# Patient Record
Sex: MALE | Race: ASIAN | NOT HISPANIC OR LATINO | Employment: UNEMPLOYED | URBAN - METROPOLITAN AREA
[De-identification: names, ages, dates, MRNs, and addresses within clinical notes are randomized per-mention and may not be internally consistent; named-entity substitution may affect disease eponyms.]

---

## 2023-09-21 ENCOUNTER — OFFICE VISIT (OUTPATIENT)
Dept: FAMILY MEDICINE | Facility: CLINIC | Age: 28
End: 2023-09-21
Payer: MEDICAID

## 2023-09-21 VITALS
OXYGEN SATURATION: 100 % | SYSTOLIC BLOOD PRESSURE: 111 MMHG | BODY MASS INDEX: 37.15 KG/M2 | HEIGHT: 69 IN | TEMPERATURE: 97.7 F | DIASTOLIC BLOOD PRESSURE: 72 MMHG | WEIGHT: 250.8 LBS | RESPIRATION RATE: 14 BRPM | HEART RATE: 57 BPM

## 2023-09-21 DIAGNOSIS — Z11.59 NEED FOR HEPATITIS C SCREENING TEST: ICD-10-CM

## 2023-09-21 DIAGNOSIS — Z11.4 SCREENING FOR HIV (HUMAN IMMUNODEFICIENCY VIRUS): ICD-10-CM

## 2023-09-21 DIAGNOSIS — M25.561 CHRONIC PAIN OF RIGHT KNEE: ICD-10-CM

## 2023-09-21 DIAGNOSIS — Z13.9 ENCOUNTER FOR SCREENING INVOLVING SOCIAL DETERMINANTS OF HEALTH (SDOH): Primary | ICD-10-CM

## 2023-09-21 DIAGNOSIS — G89.29 CHRONIC BILATERAL LOW BACK PAIN WITHOUT SCIATICA: ICD-10-CM

## 2023-09-21 DIAGNOSIS — M54.50 CHRONIC BILATERAL LOW BACK PAIN WITHOUT SCIATICA: ICD-10-CM

## 2023-09-21 DIAGNOSIS — G89.29 CHRONIC PAIN OF RIGHT KNEE: ICD-10-CM

## 2023-09-21 DIAGNOSIS — Z23 ENCOUNTER FOR IMMUNIZATION: ICD-10-CM

## 2023-09-21 PROCEDURE — 90471 IMMUNIZATION ADMIN: CPT

## 2023-09-21 PROCEDURE — 99203 OFFICE O/P NEW LOW 30 MIN: CPT | Mod: 25

## 2023-09-21 PROCEDURE — 90686 IIV4 VACC NO PRSV 0.5 ML IM: CPT

## 2023-09-21 ASSESSMENT — ENCOUNTER SYMPTOMS
JOINT SWELLING: 0
ARTHRALGIAS: 0
NAUSEA: 0
HEMATOCHEZIA: 0
DIZZINESS: 0
MYALGIAS: 1
WEAKNESS: 0
CONSTIPATION: 0
PARESTHESIAS: 0
SHORTNESS OF BREATH: 0
FEVER: 0
NERVOUS/ANXIOUS: 0
HEADACHES: 0
FREQUENCY: 0
CHILLS: 0
DIARRHEA: 0
ABDOMINAL PAIN: 0
DYSURIA: 0
PALPITATIONS: 0
HEMATURIA: 0
EYE PAIN: 0
HEARTBURN: 0
COUGH: 0
SORE THROAT: 0

## 2023-09-21 ASSESSMENT — PAIN SCALES - GENERAL: PAINLEVEL: MODERATE PAIN (4)

## 2023-09-21 NOTE — PROGRESS NOTES
"  Assessment & Plan     Chronic pain of right knee  - Orthopedic  Referral; Future  - MR Knee Right w/o Contrast; Future  Pain may stem from either chronic tendinitis or an MCL/PCL injury.  Given that patient has been trying PT without relief, it is reasonable to order an MRI.  I do recommend he follow-up with  orthopedics regarding interpretation and management.    Chronic bilateral low back pain without sciatica  - Physical Therapy Referral; Future  Patient's pain is currently nonradiating.  He he was advised that this pain is likely related to muscle weakness from previous providers in [place].  Given that patient has never tried physical therapy, I recommend that he try some PT for his back pain.  He might have a slight nerve impingement given that the pain previously radiated to his feet, although the radiation part has improved.  Pain may stem from a stenosis origin given that it seems worse when he is sitting or laying down for long periods.  He had also reported \"leg being asleep \"when I was assessing his knee, which may further point towards nerve impingement.      Encounter for immunization  - INFLUENZA VACCINE IM > 6 MONTHS VALENT IIV4 (AFLURIA/FLUZONE)        Artie Dee NP  [facility]    Jeff Moran is a 28 year old, presenting for the following health issues:  Knee Pain and MRI Referral    1 year ago, patient had fallen down and had a ligament injury. Was advised to get an MRI to determine whether he needs an operation by a doctor in [place]. He was seen in [place] by a doctor for this issue. Pain is improved compared to one year ago. Pain is worse with standing or sitting. Posterior and medial knee pain.    He had been wrestling with a partner and leg bent to the side and friend fell on the leg as well.   He has been doing PT to try and improve it. Does not use any analgesic medicine (used some about 1 year ago).    Whenever patient stands for about 1 hour, " patient occurs in the back, and whenever he sits, the pain increases with sitting. Right sided and medial lower back. This has been occurring for the past 3 years. Went to [place] for his back. Sometimes it radiates down the legs, but lately just occurs in his lower back. No issues with numbness/tingling (but does report it falls asleep at nighttime). It started when he was doing bodybuilding in the gym.      9/21/2023     9:54 AM   Additional Questions   Roomed by Luis Thibodeaux   Accompanied by Aster - Wife       Healthy Habits:     Getting at least 3 servings of Calcium per day:  Yes    Bi-annual eye exam:  Yes    Dental care twice a year:  NO    Sleep apnea or symptoms of sleep apnea:  None    Diet:  Regular (no restrictions)    Frequency of exercise:  4-5 days/week    Duration of exercise:  30-45 minutes    Taking medications regularly:  Yes    Medication side effects:  None    Additional concerns today:  No     Pt stated that he did not want a physical done today - would just like to address knee pain and get an MRI referral.       Review of Systems   Constitutional:  Negative for chills and fever.   HENT:  Negative for congestion, ear pain, hearing loss and sore throat.    Eyes:  Negative for pain and visual disturbance.   Respiratory:  Negative for cough and shortness of breath.    Cardiovascular:  Negative for chest pain, palpitations and peripheral edema.   Gastrointestinal:  Negative for abdominal pain, constipation, diarrhea, heartburn, hematochezia and nausea.   Genitourinary:  Negative for dysuria, frequency, genital sores, hematuria, impotence, penile discharge and urgency.   Musculoskeletal:  Positive for myalgias. Negative for arthralgias and joint swelling.   Skin:  Negative for rash.   Neurological:  Negative for dizziness, weakness, headaches and paresthesias.   Psychiatric/Behavioral:  Negative for mood changes. The patient is not nervous/anxious.       Constitutional, HEENT, cardiovascular,  "pulmonary, gi and gu systems are negative, except as otherwise noted.      Objective    /72 (BP Location: Right arm, Patient Position: Sitting, Cuff Size: Adult Large)   Pulse 57   Temp 97.7  F (36.5  C) (Temporal)   Resp 14   Ht 1.763 m (5' 9.41\")   Wt 113.8 kg (250 lb 12.8 oz)   SpO2 100%   BMI 36.60 kg/m    Body mass index is 36.6 kg/m .  Physical Exam   GENERAL: healthy, alert and no distress  MS: Right leg: No edema noted over right leg.  Patient does have tenderness to palpation over the medial aspect of the right knee and posterior side of the right knee.  No crepitus noted.  Some pain with the posterior drawer test.  No significant ligament laxity.  Waqar negative.  Valgus and varus negative, although patient does report sensation of \"leg being asleep\" with the leg fully straight.  SKIN: no suspicious lesions or rashes  PSYCH: mentation appears normal, affect normal/bright                "

## 2023-09-21 NOTE — COMMUNITY RESOURCES LIST (PATIENT PREFERRED LANGUAGE)
09/21/2023   [facility]  N/A  ????????????????? ????????? ????????????????????????????????????????????????????????????????? ??????????? ????????????????????????????????? ????????? ????????????????????????? ??????????  Phone: [phone]   Email: N/A   Address: [address] [place] [address]   Hours: N/A        ??????????????????       ?????????????? ???????????????????? ??????  1  [facility] - [place] Office - ???????????????? ??????????????????? (SHAP) ????????: 1.03 ?????      ?????/???   [address] [place] [address]  ????????: ????, ???????, ???, ???????????  ????: ?????????? - ????????? 08:30 - 17:00  ???????: ?????   Phone: [phone] Email: palma@[facility].org Website: http://www.[facility].org/[facility]-impact-areas/     2  ????????????????? - [facility] ?????????????????????????????? ????????: 1.3 ?????      ?????/???   [address] [place] [address]  ????????: ???????????  ????: ?????????? - ????????? 09:00 - 16:00  ???????: ?????   Phone: [phone] Email: gregg@[facility].Prevacusy.org Website: http://FancloudAleda E. Lutz Veterans Affairs Medical CenterCreditCards.comScotland [facility] Hospital.org/community/zy-wjhz-fkftv-jaqueline/     ??????????????????????????  3  [employer] [facility] ???????????? ????????: 0.84 ?????      ?????/???   [address] [place] [address]  ????????: ???, ???????????  ????: ?????????? - ??????????? 08:00 - 16:00 , ????????? 08:00 - 12:00  ???????: ?????   Phone: [phone] Email: ecc@[employer] The [employer].org Website: http://energyChina Broad Media.org/     4  [employer]) - [place] Office - ???????????????? ??????????????????? (SHAP) ????????: 1.03 ?????      ?????/???   [address] [place] [address]  ????????: ????, ???????, ???, ???????????  ????: ?????????? - ????????? 08:30 - 17:00  ???????: ?????   Phone: [phone] Email: palma@[facility] Website: http://www.[facility].org/[facility] Medical Center-impact-areas/          ???????????? ?????       ?????????? ???????  5  ???????????? [facility] -  ?????????? - ?????????? ????????: 0.93 ?????      ??????????????????   [address] [place] [address]  ????????: ???, ???????, ???, ???????????, ???????? ???????????? ????????  ????: ?????????? - ????????? 12:00 - 13:00  ???????: ?????   Phone: [phone] Email: myles@[email or web address].Forgame Website: https://www.[facility].org/locations/st_oscar_[facility]_ymca?utm_source=google&utm_medium=local&utm_campaign=local%20search     6  ????????????????? - [facility] ?????????????????????????????? ????????: 1.3 ?????      ??????????????????   [address] [place] [address]  ????????: ???????????  ????: ?????????? - ????????? 09:00 - 15:00  ???????: ??????????????????????, ?????   Phone: [phone] Email: gregg@[facility].Calligoationarmy.org Website: http://[facility].org/community/gc-fxoj-ijgvb-ave/     SNAP ?????????????????  7  ????????????????? - [facility] ?????????????????????????????? ????????: 1.3 ?????      ?????/???   [address] [place] [address]  ????????: ???????????  ????: ?????????? - ??????????? 09:00 - 16:00 , ????????? 09:00 - 14:00 , ???????????? 10:00 - 12:00  ???????: ?????   Phone: [phone] Email: gregg@[facility].org Website: http://[facility].org/community/cr-qtou-aliux-ave/     8  [place] ??????????????????? - MNFoodHelper (SNAP) ????????: 3.26 ?????      ?????/???   [address] [address] [place] [address]  ????????: ???, ???????, ???????????????, ???, ????, ???????????  ????: ?????????? - ????????? 09:00 - 17:00  ???????: ?????   Phone: [phone] Website: https://[place].gov/dhs/people-we-serve/adults/economic-assistance/food-nutrition/programs-and-services/supplemental-nutrition-assistance-program.jsp     ???????????????????? ????????? ????????????????  9  ????????????????? - [facility] ?????????????????????????????? ????????: 1.3 ?????      ??????????????????   [address] [place] [address]  ????????: ???????????  ????: ?????????? - ????????? 11:45 - 12:45   ???????: ?????   Phone: [phone] Email: freddysalashley@[facility].org Website: http://[facility].org/Critical access hospital/bj/     10  ??????????? ???????????? ??????? - ??????????????????????????? ??????????? ????????: 2.39 ?????      ????????????   [address] [place] [address]  ????????: ???????????  ????: ?????????? - ???????????? 06:30 - 07:30 , ?????????? - ???????????? 12:00 - 13:00 , ?????????? - ???????????? 17:30 - 18:30  ???????: ?????   Phone: [phone] Email: myles@[facility].org Website: https://[facility].org/about-us/contact/          ??????????????????       ????? ????????? ????????? ?????????????????  11  [employer] ????????: 4.27 ?????      ????????????   [address] [place] [address]  ????????: ???, ???????????  ????: ?????????? - ???????????? 24 ?????????????  ???????: ??????????????????????   Phone: [phone] Ext2 Website: http://www.Wimbarotransit.org     12  ????????? - [employer] Bus ????????: 4.9 ?????      ????????????   [address] [place] [address]  ????????: ???????????  ????: ????????? 09:00 - 14:00  ???????: ??????????????????????   Phone: [phone] Email: info@Cormedics.invino Website: http://www.dartsconnects.org/     ????????????????????????????????????????????????  13  [facility] ?????????????????????????????? - ???????????????? ?????????????????????????????? ????????: 1.83 ?????      ????????????   [address] [place] [address]  ????????: ???????????  ????: ?????????? - ????????? 08:00 - 16:00 Appt ??  ???????: ??????????????????????   Phone: [phone] Website: http://www.[facility].org/Medical-Services/Emergency-medical-services/Non-emergency-transportation/     14  ???????????????? - ????????????????????????????????? ??????????????? ????????: 2.12 ?????      ????????????   [address] [place] [address]  ????????: ???????????  ????: ?????????? - ??????????? 06:00 - 18:00 , ?????????  06:00 - 17:00  ???????: ??????????????????????, ?????   Phone: [phone] Email: office@[facility] Website: https://www.discoverride.com/          ??????????????????????? ????????????       ???????????????????????   911  [facility]   311  ??????????????????   [phone]  ???????????????????? Lifeline   [phone]) [phone] (TALK)  ?????????????????? Hotline   (373) 880-8603 (4-A-Child)  ??????????????????????????? hotline   (385) 380-5299 (HOPE)  ????????????????? Safeline   ([phone]) [phone] (RUNAWAY)  All-Options Talkline   ([phone]) [phone]  ????????????????????????????????   [phone] (HELP)

## 2023-09-21 NOTE — COMMUNITY RESOURCES LIST (ENGLISH)
09/21/2023   [facility]  N/A  For questions about this resource list or additional care needs, please contact your primary care clinic or care manager.  Phone: [phone]   Email: N/A   Address: [address] [place] [address]   Hours: N/A        Financial Stability       Rent and mortgage payment assistance  1  [facility] - [place] Office - Supportive Housing Assistance Program (SHAP) Distance: 1.03 miles      Phone/Virtual   [address] [id number]  Language: English, Hmong, Martina, Djiboutian  Hours: Mon - Fri 8:30 AM - 5:00 PM  Fees: Free   Phone: [phone]) [phone] Email: palma@[employer].SplashMaps Website: http://www.OVIA.org/[facility]-impact-areas/     2  [facility] Distance: 1.3 miles      Phone/Virtual   [address] [place] [address]  Language: English  Hours: Mon - Fri 9:00 AM - 4:00 PM  Fees: Free   Phone: [phone]) [phone] Email: gregg@[facility].org Website: http://[facility].org/[facility]/     Utility payment assistance  3  [employer] [facility] [facility] Distance: 0.84 miles      Phone/Virtual   [address] [place], [place] [address]  Language: English, Estonian  Hours: Mon - Thu 8:00 AM - 4:00 PM , Fri 8:00 AM - 12:00 PM  Fees: Free   Phone: (801) 533-6112 Email: ecc@energycents.org Website: http://energycents.org/     4  [facility] Office - Supportive Housing Assistance Program (SHAP) Distance: 1.03 miles      Phone/Virtual   [address] [id number]  Language: English, Hmong, Martina, Djiboutian  Hours: Mon - Fri 8:30 AM - 5:00 PM  Fees: Free   Phone: (353) 281-8622 Email: palma@[employer].SplashMaps Website: http://www.Talkoong.org/hap-impact-areas/          Food and Nutrition       Food pantry  5  [facility] Distance: 0.93 miles      [address] [place] [address]  Language: American Sign Language,  English, Hmong, Italian, Tamazight  Hours: Mon - Fri 12:00 PM - 1:00 PM  Fees: Free   Phone: (328) [phone] Email: info@[facility].SocialMart Website: https://www.[facility].SocialMart/locations/[facility]?utm_source=google&utm_medium=local&utm_campaign=local%20search     6  [facility] Distance: 1.3 miles      Pickup   [address] [place] [address]  Language: English  Hours: Mon - Tue 9:00 AM - 3:00 PM  Fees: Free, Self Pay   Phone: (328) [phone] Email: gregg@[facility] Website: http://[facility] CenteriCreate SoftwareK[facility].org/[facility]/qb-rppd-rdrvn-[facility]/     SNAP application assistance  7  [facility] Distance: 1.3 miles      Phone/Virtual   [address] [place] [address]  Language: English  Hours: Mon - Thu 9:00 AM - 4:00 PM , Fri 9:00 AM - 2:00 PM , Sun 10:00 AM - 12:00 PM  Fees: Free   Phone: [phone]) [phone] Email: gregg@[facility] Website: http://[facility] CenteriCreate SoftwareK[facility].org/[facility]/hs-myiw-pfjzl-[facility]/     8  [place] Department of Human Services - MNFoodHelper (SNAP) Distance: 2.12 miles      Phone/Virtual   [address] [address] [place] [address]  Language: English, Hmong, Costa Rican, Italian, Tamazight, Burmese  Hours: Mon - Fri 9:00 AM - 5:00 PM  Fees: Free   Phone: (227) 594-4233 Website: https://[place].gov/dhs/people-we-serve/adults/economic-assistance/food-nutrition/programs-and-services/supplemental-nutrition-assistance-program.jsp     Soup kitchen or free meals  9  [facility] Distance: 1.3 miles      Pickup   [address] [place] [address]  Language: English  Hours: Mon - Fri 11:45 AM - 12:45 PM  Fees: Free   Phone: (155) 876-5534 Email: gregg@[facility].1Mind.org Website: http://[facility].org/[facility]/     10  [facility] Distance: 1.83 miles      In-Person   [facility] [place] [address]   Language: English  Hours: Mon - Sun 6:30 AM - 7:30 AM , Mon - Sun 12:00 PM - 1:00 PM , Mon - Sun 5:30 PM - 6:30 PM  Fees: Free   Phone: (421) 269-4239 Email: info@[facility].me Website: https://[facility].Fry Multimedia/about-us/contact/          Transportation       Free or low-cost transportation  11  [employer] Distance: 2.39 miles      In-Person   [address] [place] [address]  Language: English, Samoan  Hours: Mon - Sun Open 24 Hours  Fees: Self Pay   Phone: (209) 769-1686 Ext.2 Website: http://www.Paystikt.Fry Multimedia     12  [employer]  [facility] Cir[facility] Bus Distance: 4.9 miles      In-Person   [address] [place] [address]  Language: English  Hours: Tue 9:00 AM - 2:00 PM  Fees: Self Pay   Phone: (945) 704-5756 Email: info@[employer] Me Website: http://www.Push Technology.Fry Multimedia/     Transportation to medical appointments  13  [facility] - Non-Emergency Medical Transportation Distance: 3.26 miles      In-Person   [address] [place] [address]  Language: English  Hours: Mon - Fri 8:00 AM - 4:00 PM Appt. Only  Fees: Self Pay   Phone: (686) 251-2726 Website: http://www.[facility].org/Medical-Services/Emergency-medical-services/Non-emergency-transportation/     14  [employer] Ride Distance: 4.27 miles      In-Person   [address] [address]  Language: English  Hours: Mon - Thu 6:00 AM - 6:00 PM , Fri 6:00 AM - 5:00 PM  Fees: Insurance, Self Pay   Phone: (217) 714-7710 Email: office@POKKT Website: https://www.POKKT/          Important Numbers & Websites       Emergency Services   911  City Services   311  Poison Control   (318) 585-3140  Suicide Prevention Lifeline   (502) 942-4810 (TALK)  Child Abuse Hotline   (305) 618-4982 (4-A-Child)  Sexual Assault Hotline   (532) 761-3449 (HOPE)  National Runaway Safeline   (369) 817-1303 (RUNAWAY)  All-Options Talkline   (625) 666-7951  Substance Abuse Referral   (637) 572-6522 (HELP)

## 2023-10-07 ENCOUNTER — ANCILLARY PROCEDURE (OUTPATIENT)
Dept: MRI IMAGING | Facility: CLINIC | Age: 28
End: 2023-10-07
Payer: COMMERCIAL

## 2023-10-07 DIAGNOSIS — G89.29 CHRONIC PAIN OF RIGHT KNEE: ICD-10-CM

## 2023-10-07 DIAGNOSIS — M25.561 CHRONIC PAIN OF RIGHT KNEE: ICD-10-CM

## 2023-10-07 PROCEDURE — 73721 MRI JNT OF LWR EXTRE W/O DYE: CPT | Mod: RT | Performed by: RADIOLOGY

## 2023-10-09 NOTE — PROGRESS NOTES
ASSESSMENT & PLAN    Luisa was seen today for pain.    Diagnoses and all orders for this visit:    Patellofemoral pain syndrome of right knee  -     Physical Therapy Referral; Future    Chronic pain of right knee  -     Orthopedic  Referral    Old tear of lateral meniscus of right knee, unspecified tear type  -     Physical Therapy Referral; Future      28-year-old male presents with right knee pain that has been ongoing for approximately 1 year.  He did have a remote wrestling injury, but the mechanism was unclear.  He has not tried anything for this so far. His exam was relatively unremarkable today, with the only positive finding of a positive patellar grind, but he had no ligamentous laxity, full range of motion, no effusion. History was significantly limited today due to language barrier and poor health literacy despite the use of an , but my suspicion is that his pain is primarily coming from patellofemoral pain syndrome.  It was very difficult to elicit whether he was having any mechanical symptoms upon taking his history, but it does not sound like this has been an issue.  He does have a very small lateral meniscus tear seen on MRI, but given lack of any mechanical symptoms I think this should be treated conservatively at this time.  The patient does report that he was told by another physician that he had an unknown ligament injury, however I do not see evidence of that today on either MRI or physical exam.    Plan:  - I encouraged the patient to work on staying active and I recommend that he start physical therapy and work on compliance with his home exercise program  - He can take Tylenol as needed for pain up to 3 times daily  - Follow-up in approximately 3 months after course of PT      Return in about 3 months (around 1/10/2024).      Dr. Su Castro, DO  [facility]  Sports Medicine     -----  Chief Complaint   Patient presents with    Right Knee -  Pain       SUBJECTIVE  Luisa Love is a/an 28 year old  male who is seen in consultation at the request of  Artie Dee N.P. for evaluation of R knee pain.  Onset was 1 year ago; acute injury from wrestling match after being landed on by another wrestler, pain is located anterior, medial, and diffusely around knee. Symptoms are worsened by standing for a long time and sitting on his knees. He has tried no treatment. Associated symptoms include posterior numbness, does not feel likes it gives out on him.     The patient is seen with his wife today . History obtained with help of .     Prior injury/Surgical history of affected joint: No  Social History/Occupation: Currently not working    REVIEW OF SYSTEMS:  Pertinent positives/negative: As stated above in HPI    OBJECTIVE:  There were no vitals taken for this visit.   General: Alert and in no distress  Skin: no visable rashes  CV: Extremities appear well perfused   Resp: normal respiratory effort, no conversational dyspnea   Psych: normal mood, affect  MSK:  RIGHT KNEE  Inspection:    Normal alignment; no edema, erythema, or ecchymosis present  Palpation:    Bony and ligamentous landmarks are nontender.    No effusion is present    Patellofemoral crepitus is Present  Range of Motion:     00 extension to 1350 flexion  Strength:  5/5 knee extension    Extensor mechanism intact  Special Tests:    Positive: Patellar grind    Negative: patellar apprehension, MCL/valgus stress (0 & 30 deg), LCL/varus stress (0 & 30 deg), Lachman's, posterior drawer       RADIOLOGY:  Final results and radiologist's interpretation available in the [facility] health record.  Images below were personally reviewed and discussed with the patient in the office today.  My personal interpretation of the performed imaging: MRI of the right knee from 10/7/2023 does reveal slight blunting of the lateral meniscus on sagittal view at the posterior horn which may be consistent with a small  free edge tear, and no evidence of any ligament disruption, medial meniscus is intact.      Review of prior external note(s) from - primary care  50 minutes spent by me on the date of the encounter doing chart review, history and exam, documentation and further activities per the note

## 2023-10-10 ENCOUNTER — OFFICE VISIT (OUTPATIENT)
Dept: ORTHOPEDICS | Facility: CLINIC | Age: 28
End: 2023-10-10
Payer: COMMERCIAL

## 2023-10-10 DIAGNOSIS — G89.29 CHRONIC PAIN OF RIGHT KNEE: ICD-10-CM

## 2023-10-10 DIAGNOSIS — M25.561 CHRONIC PAIN OF RIGHT KNEE: ICD-10-CM

## 2023-10-10 DIAGNOSIS — M23.200 OLD TEAR OF LATERAL MENISCUS OF RIGHT KNEE, UNSPECIFIED TEAR TYPE: ICD-10-CM

## 2023-10-10 DIAGNOSIS — M22.2X1 PATELLOFEMORAL PAIN SYNDROME OF RIGHT KNEE: Primary | ICD-10-CM

## 2023-10-10 PROCEDURE — 99204 OFFICE O/P NEW MOD 45 MIN: CPT | Performed by: STUDENT IN AN ORGANIZED HEALTH CARE EDUCATION/TRAINING PROGRAM

## 2023-10-10 NOTE — LETTER
10/10/2023         RE: Luisa Love  [address]  [place] [address]        Dear Colleague,    Thank you for referring your patient, Luisa Love, to the [facility]. Please see a copy of my visit note below.    ASSESSMENT & PLAN    Luisa was seen today for pain.    Diagnoses and all orders for this visit:    Patellofemoral pain syndrome of right knee  -     Physical Therapy Referral; Future    Chronic pain of right knee  -     Orthopedic  Referral    Old tear of lateral meniscus of right knee, unspecified tear type  -     Physical Therapy Referral; Future      28-year-old male presents with right knee pain that has been ongoing for approximately 1 year.  He did have a remote wrestling injury, but the mechanism was unclear.  He has not tried anything for this so far. His exam was relatively unremarkable today, with the only positive finding of a positive patellar grind, but he had no ligamentous laxity, full range of motion, no effusion. History was significantly limited today due to language barrier and poor health literacy despite the use of an , but my suspicion is that his pain is primarily coming from patellofemoral pain syndrome.  It was very difficult to elicit whether he was having any mechanical symptoms upon taking his history, but it does not sound like this has been an issue.  He does have a very small lateral meniscus tear seen on MRI, but given lack of any mechanical symptoms I think this should be treated conservatively at this time.  The patient does report that he was told by another physician that he had an unknown ligament injury, however I do not see evidence of that today on either MRI or physical exam.    Plan:  - I encouraged the patient to work on staying active and I recommend that he start physical therapy and work on compliance with his home exercise program  - He can take Tylenol as needed for pain up to 3  times daily  - Follow-up in approximately 3 months after course of PT      Return in about 3 months (around 1/10/2024).      Dr. Su Castro, DO  [facility]  Sports Medicine     -----  Chief Complaint   Patient presents with     Right Knee - Pain       SUBJECTIVE  Luisa Love is a/an 28 year old  male who is seen in consultation at the request of  Artie Dee N.P. for evaluation of R knee pain.  Onset was 1 year ago; acute injury from wrestling match after being landed on by another wrestler, pain is located anterior, medial, and diffusely around knee. Symptoms are worsened by standing for a long time and sitting on his knees. He has tried no treatment. Associated symptoms include posterior numbness, does not feel likes it gives out on him.     The patient is seen with his wife today . History obtained with help of .     Prior injury/Surgical history of affected joint: No  Social History/Occupation: Currently not working    REVIEW OF SYSTEMS:  Pertinent positives/negative: As stated above in HPI    OBJECTIVE:  There were no vitals taken for this visit.   General: Alert and in no distress  Skin: no visable rashes  CV: Extremities appear well perfused   Resp: normal respiratory effort, no conversational dyspnea   Psych: normal mood, affect  MSK:  RIGHT KNEE  Inspection:    Normal alignment; no edema, erythema, or ecchymosis present  Palpation:    Bony and ligamentous landmarks are nontender.    No effusion is present    Patellofemoral crepitus is Present  Range of Motion:     00 extension to 1350 flexion  Strength:  5/5 knee extension    Extensor mechanism intact  Special Tests:    Positive: Patellar grind    Negative: patellar apprehension, MCL/valgus stress (0 & 30 deg), LCL/varus stress (0 & 30 deg), Lachman's, posterior drawer       RADIOLOGY:  Final results and radiologist's interpretation available in the [facility] health record.  Images below were personally  reviewed and discussed with the patient in the office today.  My personal interpretation of the performed imaging: MRI of the right knee from 10/7/2023 does reveal slight blunting of the lateral meniscus on sagittal view at the posterior horn which may be consistent with a small free edge tear, and no evidence of any ligament disruption, medial meniscus is intact.      Review of prior external note(s) from - primary care  50 minutes spent by me on the date of the encounter doing chart review, history and exam, documentation and further activities per the note               Again, thank you for allowing me to participate in the care of your patient.        Sincerely,        Su Castro, DO

## 2023-10-10 NOTE — PATIENT INSTRUCTIONS
-Can take Tylenol up to three times daily as needed for pain  -Start physical therapy and home exercise program       Thank you for [facility]!    DR. REYNA'S CLINIC LOCATIONS:     [place]  TRIAGE LINE: [phone] [address] APPOINTMENTS: [phone]   [place] [address] RADIOLOGY: [phone]   (Mondays & Tuesdays) HAND THERAPY: [phone]    PHYSICAL THERAPY: [phone]   [place] BILLING QUESTIONS: [phone] [address] FAX: [phone]   [place] [address]    (Thursdays & Fridays)

## 2023-10-13 ENCOUNTER — THERAPY VISIT (OUTPATIENT)
Dept: PHYSICAL THERAPY | Facility: REHABILITATION | Age: 28
End: 2023-10-13
Attending: STUDENT IN AN ORGANIZED HEALTH CARE EDUCATION/TRAINING PROGRAM
Payer: COMMERCIAL

## 2023-10-13 DIAGNOSIS — M22.2X1 PATELLOFEMORAL PAIN SYNDROME OF RIGHT KNEE: ICD-10-CM

## 2023-10-13 DIAGNOSIS — M23.200 OLD TEAR OF LATERAL MENISCUS OF RIGHT KNEE, UNSPECIFIED TEAR TYPE: ICD-10-CM

## 2023-10-13 PROCEDURE — 97112 NEUROMUSCULAR REEDUCATION: CPT | Mod: GP | Performed by: PHYSICAL THERAPIST

## 2023-10-13 PROCEDURE — 97162 PT EVAL MOD COMPLEX 30 MIN: CPT | Mod: GP | Performed by: PHYSICAL THERAPIST

## 2023-10-13 NOTE — PROGRESS NOTES
PHYSICAL THERAPY EVALUATION  Type of Visit: Evaluation    See electronic medical record for Abuse and Falls Screening details.    Subjective       Presenting condition or subjective complaint: Adarsh receives excellent interpretation from his wife today. 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 He says that about one year ago, during a competition in [employer] [facility], he fell down and his opponent fell on his R knee. He demonstrates a fall onto the medial R knee. He felt a 10/10 pain his R knee immediately and was not able to stand on the knee immediately afterward. Not able to walk either, he was helped out by his friends. He could not sleep that night because of the pain. He has pain for five days, and was not able to walk during this period. From day 6, the pain decreased; he was on painkillers. He went to two different doctors in [facility]; one said that he needed an operation for a torn ligament; the other doctor said that they would have to wait for three months before diagnosing. He did have an MRI in [facility] and the pt remembers that it showed some problem with a ligament and a muscle and points to his R medial and inferior knee. But he and his wife left for [facility] before having the second MRI. He notes pain medial anterior knee and also that he feels that the posterolateral knee feels like it falls asleep during squatting, and then will have pain after squatting. During exam, therapist finds evidence that the R knee pain may be radicular in origin and asks patient about history of back pain and injury. 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   Date of onset: 10/10/23    Relevant medical history:     Dates & types of surgery:      Prior diagnostic imaging/testing results:          Patient goals for therapy: Eliminate knee pain      Pain assessment: Location: R Knee/Ratin/10 when squatting     Objective   KNEE  "EVALUATION  PAIN: Pain Level with Use: 8/10  INTEGUMENTARY (edema, incisions):  Measurements taken mid-thigh, mid-patella, mid-calf and at ankle; side to side differences were within the range of measurement error.  POSTURE: WNL  GAIT:  Weightbearing Status:  Grossly WNL; R foot turns out, bilateral genu varum R>L  Assistive Device(s): None  Gait Deviations:  Patient reports some superior patellar pain during gait analysis today.   BALANCE/PROPRIOCEPTION: WNL  WEIGHTBEARING ALIGNMENT: Knee WB Alignment:Genu valgus L, Genu valgus R  NON-WEIGHTBEARING ALIGNMENT: WNL  ROM:  Terminal R knee passive flexion reproduces posterior knee pain and numbness.     STRENGTH: WNL  FLEXIBILITY: WNL  SPECIAL TESTS:  + R knee valgus stresss (this test negative for pain but positive for excessive valgus ROM and \"clunk\" upon return to rectus orientation. ) - varus stress; - Lachman; - anterior drawer; - posterior drawer  FUNCTIONAL TESTS:   PALPATION:  Patient is TTP at multiple areas of both knees. However, palpation of R common fibular nerve behind and just proximal to R knee reproduces patient's concordant symptoms of \"numbness\" and posterolateral knee pain that he experiences whilst squatting. Subsequent to this finding, patient tests positive on R SLR and R Slump tests.      Assessment & Plan   CLINICAL IMPRESSIONS  Medical Diagnosis: Patellofemoral pain syndrome of right knee  Old tear of lateral meniscus of right knee, unspecified tear type    Treatment Diagnosis: knee pain; radicular pain   Impression/Assessment:  Luisa presents to physical therapy approximately one year after a wresting injury in [facility] and has experienced right knee pain since that time. Patient [place] and was told that he may have a ligament tear or injury. He received a second MRI prior to this session, as ordered by referring physician, and this MRI was normal but for an old, stable R lateral meniscus tear. Patient presents " today with diagnosis of patellofemoral syndrome. Per patient's report, he experiences right anteromedial pain when sitting or standing for long periods and also posterolateral numbness and pain when squatting. Etiology of anteromedial pain is unclear after exam today, although some laxity in valgus is noted and perhaps relates to the patient's symptoms. With respect to the posterolateral symptoms, these could be reproduced with palpation of the common fibular nerve just proximal to the knee, as well as by straight-leg raise and slump tests, which would both implicate the sciatic nerve and a possible radicular origin of these symptoms. Patient was prescribed sciatic nerve glides accordingly and we will assess their effect at first follow-up.     Clinical Decision Making (Complexity):  Clinical Presentation: Stable/Uncomplicated  Clinical Presentation Rationale: based on medical and personal factors listed in PT evaluation  Clinical Decision Making (Complexity): Moderate complexity    PLAN OF CARE  Treatment Interventions:  Interventions: Gait Training, Manual Therapy, Neuromuscular Re-education, Therapeutic Activity, Therapeutic Exercise    Long Term Goals     PT Goal 1  Goal Identifier: Knee Pain  Goal Description: Patient will report 0/10 R knee pain for two consecutive sessions.  Rationale: to maximize safety and independence with performance of ADLs and functional tasks;to maximize safety and independence within the home;to maximize safety and independence within the community;to maximize safety and independence with transportation;to maximize safety and independence with self cares  Target Date: 01/11/24  PT Goal 2  Goal Identifier: SLR and SLUMP  Goal Description: Patient will test negative on R SLR and SLUMP for two consecutive treatment sessions.  Rationale: to maximize safety and independence with performance of ADLs and functional tasks;to maximize safety and independence within the home;to maximize safety  and independence within the community;to maximize safety and independence with transportation;to maximize safety and independence with self cares  Target Date: 01/11/24  PT Goal 3  Goal Identifier: LEFS  Goal Description: Patient will score >70/80 on LEFS.  Rationale: to maximize safety and independence with performance of ADLs and functional tasks;to maximize safety and independence within the home;to maximize safety and independence within the community;to maximize safety and independence with transportation;to maximize safety and independence with self cares  Goal Progress: Patient scored 40/80 on LEFS at intial evaluation.  Target Date: 01/11/24      Frequency of Treatment: bi-weekly  Duration of Treatment: 90 days    Recommended Referrals to Other Professionals:   Education Assessment:        Risks and benefits of evaluation/treatment have been explained.   Patient/Family/caregiver agrees with Plan of Care.     Evaluation Time:     PT Eval, Moderate Complexity Minutes (87888): 25       Signing Clinician: Jack Bill, PT      [facility]                                                                                   OUTPATIENT PHYSICAL THERAPY      PLAN OF TREATMENT FOR OUTPATIENT REHABILITATION   Patient's Last Name, First Name, Luisa Ramos YOB: 1995   Provider's Name   [facility]   Medical Record No.  [id number]     Onset Date: 10/10/23  Start of Care Date: 10/13/23     Medical Diagnosis:  Patellofemoral pain syndrome of right knee  Old tear of lateral meniscus of right knee, unspecified tear type      PT Treatment Diagnosis:  knee pain; radicular pain Plan of Treatment  Frequency/Duration: bi-weekly/ 90 days    Certification date from 10/13/23 to 01/11/24         See note for plan of treatment details and functional goals     Jack Bill, PT                         I CERTIFY THE NEED FOR THESE SERVICES  FURNISHED UNDER        THIS PLAN OF TREATMENT AND WHILE UNDER MY CARE     (Physician attestation of this document indicates review and certification of the therapy plan).                Referring Provider:  Su Castro      Initial Assessment  See Epic Evaluation- Start of Care Date: 10/13/23

## 2023-10-17 PROBLEM — M22.2X1 PATELLOFEMORAL PAIN SYNDROME OF RIGHT KNEE: Status: ACTIVE | Noted: 2023-10-17

## 2023-10-17 PROBLEM — M23.200 OLD TEAR OF LATERAL MENISCUS OF RIGHT KNEE, UNSPECIFIED TEAR TYPE: Status: ACTIVE | Noted: 2023-10-17

## 2023-11-10 ENCOUNTER — THERAPY VISIT (OUTPATIENT)
Dept: PHYSICAL THERAPY | Facility: REHABILITATION | Age: 28
End: 2023-11-10
Payer: COMMERCIAL

## 2023-11-10 DIAGNOSIS — G89.29 CHRONIC BILATERAL LOW BACK PAIN WITHOUT SCIATICA: ICD-10-CM

## 2023-11-10 DIAGNOSIS — M23.200 OLD TEAR OF LATERAL MENISCUS OF RIGHT KNEE, UNSPECIFIED TEAR TYPE: ICD-10-CM

## 2023-11-10 DIAGNOSIS — M54.50 CHRONIC BILATERAL LOW BACK PAIN WITHOUT SCIATICA: ICD-10-CM

## 2023-11-10 DIAGNOSIS — M54.16 LUMBAR RADICULOPATHY: ICD-10-CM

## 2023-11-10 DIAGNOSIS — M22.2X1 PATELLOFEMORAL PAIN SYNDROME OF RIGHT KNEE: Primary | ICD-10-CM

## 2023-11-10 PROCEDURE — 97530 THERAPEUTIC ACTIVITIES: CPT | Mod: GP | Performed by: PHYSICAL THERAPIST

## 2023-11-10 PROCEDURE — 97140 MANUAL THERAPY 1/> REGIONS: CPT | Mod: GP | Performed by: PHYSICAL THERAPIST

## 2023-12-05 ENCOUNTER — THERAPY VISIT (OUTPATIENT)
Dept: PHYSICAL THERAPY | Facility: REHABILITATION | Age: 28
End: 2023-12-05
Payer: COMMERCIAL

## 2023-12-05 DIAGNOSIS — M54.50 CHRONIC BILATERAL LOW BACK PAIN WITHOUT SCIATICA: Primary | ICD-10-CM

## 2023-12-05 DIAGNOSIS — G89.29 CHRONIC BILATERAL LOW BACK PAIN WITHOUT SCIATICA: Primary | ICD-10-CM

## 2023-12-05 PROCEDURE — 97112 NEUROMUSCULAR REEDUCATION: CPT | Mod: 59 | Performed by: PHYSICAL THERAPIST

## 2023-12-05 PROCEDURE — 97530 THERAPEUTIC ACTIVITIES: CPT | Mod: GP | Performed by: PHYSICAL THERAPIST

## 2024-01-04 ENCOUNTER — TELEPHONE (OUTPATIENT)
Dept: PHYSICAL THERAPY | Facility: REHABILITATION | Age: 29
End: 2024-01-04

## 2024-01-11 ENCOUNTER — TELEPHONE (OUTPATIENT)
Dept: PHYSICAL THERAPY | Facility: REHABILITATION | Age: 29
End: 2024-01-11

## 2024-01-11 NOTE — PROGRESS NOTES
DISCHARGE  Reason for Discharge: Patient chooses to discontinue therapy.  Patient has been a no show for multiple visits of PT.    Equipment Issued: NA    Discharge Plan: Patient to continue home program.    Referring Provider:  Artie Dee       12/05/23 0500   Appointment Info   Signing clinician's name / credentials Ruy Anderson, PT   Visits Used 3   Medical Diagnosis Patellofemoral pain syndrome of right knee  Old tear of lateral meniscus of right knee, unspecified tear type   PT Tx Diagnosis knee pain; radicular pain   Quick Adds Certification   Progress Note/Certification   Start of Care Date 10/13/23   Onset of illness/injury or Date of Surgery 10/10/23   Therapy Frequency bi-weekly   Predicted Duration 90 days   Certification date from 10/13/23   Certification date to 01/11/24   Progress Note Completed Date 10/13/23       Present Yes    Language Marielle    ID or First/Last Name [id number]  ( stopped interpreting duting the last 10 minutes of treatment session and eventually disconnected.)   Preferred Language Marielle   GOALS   PT Goals 2;3;4   PT Goal 1   Goal Identifier Knee Pain   Goal Description Patient will report 0/10 R knee pain for two consecutive sessions.   Rationale to maximize safety and independence with performance of ADLs and functional tasks;to maximize safety and independence within the home;to maximize safety and independence within the community;to maximize safety and independence with transportation;to maximize safety and independence with self cares   Target Date 01/11/24   PT Goal 2   Goal Identifier SLR and SLUMP   Goal Description Patient will test negative on R SLR and SLUMP for two consecutive treatment sessions.   Rationale to maximize safety and independence with performance of ADLs and functional tasks;to maximize safety and independence within the home;to maximize safety and independence within the community;to maximize  "safety and independence with transportation;to maximize safety and independence with self cares   Target Date 01/11/24   PT Goal 3   Goal Identifier LEFS   Goal Description Patient will score >70/80 on LEFS.   Rationale to maximize safety and independence with performance of ADLs and functional tasks;to maximize safety and independence within the home;to maximize safety and independence within the community;to maximize safety and independence with transportation;to maximize safety and independence with self cares   Goal Progress Patient scored 40/80 on LEFS at intial evaluation.   Target Date 01/11/24   Subjective Report   Subjective Report He reports he is feeling better sometimes it is feeling good sometimes and sometime bad.  Prolonged standing or squatting makes back hurt.  3-4x/week.  Pain level is generally 6/10.   Treatment Interventions (PT)   Interventions Therapeutic Activity;Neuromuscular Re-education;Gait Training;Manual Therapy   Therapeutic Activity   Therapeutic Activities: dynamic activities to improve functional performance minutes (91614) 15   Ther Act 1 manual stretching--right: hamstrings and piriformis (supine).  hip flexors (contralateral sidelying)   Ther Act 2 hamstring stretch 30\" x 2 bilateral   Ther Act 3 QL stretch right: 30\" x 2   Ther Act 4 LAQ x 10 bilateral   Ther Act 5 Review use of OTC lumbar support.   Ther Act 6 piriformis stretch 30\" x 2 bilateral   Neuromuscular Re-education   Neuromuscular re-ed of mvmt, balance, coord, kinesthetic sense, posture, proprioception minutes (40917) 15   Neuromuscular Re-education Neuro Re-ed 2   Neuro Re-ed 1 TA set   Neuro Re-ed 2 TA set with bridging  x 10   Neuro Re-ed 3 bridging x 10   Neuro Re-ed 4 LTR x 10   Plan   Home program R sciatic nerve glides 10 reps 2/day   Plan for next session review HEP progress as tolerated, continue manual therapy.   Total Session Time   Timed Code Treatment Minutes 30   Total Treatment Time (sum of timed and " untimed services) 30